# Patient Record
Sex: MALE | URBAN - METROPOLITAN AREA
[De-identification: names, ages, dates, MRNs, and addresses within clinical notes are randomized per-mention and may not be internally consistent; named-entity substitution may affect disease eponyms.]

---

## 2024-02-15 ENCOUNTER — OV NP (OUTPATIENT)
Dept: URBAN - METROPOLITAN AREA CLINIC 118 | Facility: CLINIC | Age: 34
End: 2024-02-15

## 2024-03-14 ENCOUNTER — APPOINTMENT (RX ONLY)
Dept: URBAN - METROPOLITAN AREA CLINIC 46 | Facility: CLINIC | Age: 34
Setting detail: DERMATOLOGY
End: 2024-03-14

## 2024-03-14 DIAGNOSIS — L20.89 OTHER ATOPIC DERMATITIS: ICD-10-CM | Status: STABLE

## 2024-03-14 DIAGNOSIS — L72.8 OTHER FOLLICULAR CYSTS OF THE SKIN AND SUBCUTANEOUS TISSUE: ICD-10-CM

## 2024-03-14 DIAGNOSIS — L91.8 OTHER HYPERTROPHIC DISORDERS OF THE SKIN: ICD-10-CM

## 2024-03-14 PROCEDURE — ? ADDITIONAL NOTES

## 2024-03-14 PROCEDURE — ? PHOTO-DOCUMENTATION

## 2024-03-14 PROCEDURE — ? COUNSELING

## 2024-03-14 PROCEDURE — ? OBSERVATION AND MEASURE

## 2024-03-14 PROCEDURE — 99203 OFFICE O/P NEW LOW 30 MIN: CPT

## 2024-03-14 ASSESSMENT — BSA RASH: BSA RASH: 0

## 2024-03-14 ASSESSMENT — LOCATION ZONE DERM
LOCATION ZONE: EYELID
LOCATION ZONE: FACE
LOCATION ZONE: HAND
LOCATION ZONE: TRUNK

## 2024-03-14 ASSESSMENT — LOCATION DETAILED DESCRIPTION DERM
LOCATION DETAILED: RIGHT CENTRAL EYEBROW
LOCATION DETAILED: LEFT LATERAL INFERIOR EYELID
LOCATION DETAILED: LEFT ULNAR DORSAL HAND
LOCATION DETAILED: RIGHT SUPERIOR UPPER BACK
LOCATION DETAILED: RIGHT RADIAL DORSAL HAND

## 2024-03-14 ASSESSMENT — LOCATION SIMPLE DESCRIPTION DERM
LOCATION SIMPLE: RIGHT EYEBROW
LOCATION SIMPLE: LEFT INFERIOR EYELID
LOCATION SIMPLE: LEFT HAND
LOCATION SIMPLE: RIGHT HAND
LOCATION SIMPLE: RIGHT UPPER BACK

## 2024-03-14 ASSESSMENT — ITCH NUMERIC RATING SCALE: HOW SEVERE IS YOUR ITCHING?: 0

## 2024-03-14 NOTE — PROCEDURE: ADDITIONAL NOTES
Render Risk Assessment In Note?: no
Detail Level: Simple
Additional Notes: Benign in nature can be cosmetically removed by snip or liquid nitrogen. Cosmetic out of pocket cost $150. Pt defers removal as he is about to go on a trip.
Additional Notes: Discussed surgical excision to permanently remove. Schedule with Dr. Lowe or Dr. Avila. Pt defers as he is about to go on a trip.
Additional Notes: Pt reports dry, scaling and cracking of hands during the winter months. He is not currently flaring. Discussed moisturizer daily and when flare up occur in the winter, we can send in a prescription topical cortisone cream for relief.

## 2024-03-14 NOTE — HPI: SECONDARY COMPLAINT
How Severe Is This Condition?: mild
Additional History: Patient also is here for possible cyst affecting RT mid back that was drained 4 years ago (outside dermatologist). He has an upcoming beach trip to [facility] next Wednesday and wanted it flat.

## 2024-03-14 NOTE — HPI: EVALUATION OF SKIN LESION(S)
What Type Of Note Output Would You Prefer (Optional)?: Bullet Format
Hpi Title: Evaluation of Skin Lesions
How Severe Are Your Spot(S)?: mild
Have Your Spot(S) Been Treated In The Past?: has not been treated
Additional History: Patient is here first tiny skin tags affecting RT upper eyelid and LT lower eyelid present 5 months.

## 2025-06-16 ENCOUNTER — OFFICE VISIT (OUTPATIENT)
Dept: URBAN - METROPOLITAN AREA CLINIC 70 | Facility: CLINIC | Age: 35
End: 2025-06-16
Payer: COMMERCIAL

## 2025-06-16 ENCOUNTER — LAB OUTSIDE AN ENCOUNTER (OUTPATIENT)
Dept: URBAN - METROPOLITAN AREA CLINIC 70 | Facility: CLINIC | Age: 35
End: 2025-06-16

## 2025-06-16 ENCOUNTER — DASHBOARD ENCOUNTERS (OUTPATIENT)
Age: 35
End: 2025-06-16

## 2025-06-16 DIAGNOSIS — K52.9 ENTEROCOLITIS: ICD-10-CM

## 2025-06-16 DIAGNOSIS — R13.10 ODYNOPHAGIA: ICD-10-CM

## 2025-06-16 DIAGNOSIS — A04.9 BACTERIAL INTESTINAL INFECTION, UNSPECIFIED: ICD-10-CM

## 2025-06-16 DIAGNOSIS — K21.9 GASTROESOPHAGEAL REFLUX DISEASE, UNSPECIFIED WHETHER ESOPHAGITIS PRESENT: ICD-10-CM

## 2025-06-16 DIAGNOSIS — R93.3 ABNORMAL COMPUTED TOMOGRAPHY OF GASTROINTESTINAL TRACT: ICD-10-CM

## 2025-06-16 DIAGNOSIS — R79.89 ELEVATED LFTS: ICD-10-CM

## 2025-06-16 PROBLEM — 30233002: Status: ACTIVE | Noted: 2025-06-16

## 2025-06-16 PROBLEM — 235595009: Status: ACTIVE | Noted: 2025-06-16

## 2025-06-16 PROCEDURE — 99204 OFFICE O/P NEW MOD 45 MIN: CPT | Performed by: NURSE PRACTITIONER

## 2025-06-16 RX ORDER — SUCRALFATE 1 G/1
1 TABLET ON AN EMPTY STOMACH TABLET ORAL TWICE A DAY
Qty: 28 TABLET | Refills: 1 | OUTPATIENT
Start: 2025-06-16

## 2025-06-16 RX ORDER — OMEPRAZOLE 40 MG/1
1 CAPSULE 30 MINUTES BEFORE MORNING MEAL CAPSULE, DELAYED RELEASE ORAL ONCE A DAY
Qty: 90 | Refills: 3 | OUTPATIENT
Start: 2025-06-16

## 2025-06-16 RX ORDER — CIPROFLOXACIN HYDROCHLORIDE 500 MG/1
1 TABLET TABLET, FILM COATED ORAL
Qty: 20 | Refills: 0 | OUTPATIENT
Start: 2025-06-16 | End: 2025-06-26

## 2025-06-16 NOTE — HPI-TODAY'S VISIT:
Patient is a 33 y/o male who presents today for multiple GI complaints. He reports chronic GERD with regurgiation for years but 2 week ago he developed acute  N/V, abdominal pain, and diarrhea. Vomiting has now resolved but has developed odynophagia. Not on PPI. Diarrhea and abdominal pain has improved. Labs 6/11/25 showed Hgb 17.1, , AST 54, and alk phos 199 (WBC, plt, T.luis, and lipase normal; acute hepatitis panel negative for viral hepatitis normal). Was seen by PCP with urine +nitrates and abdominal CT6/6/25 showed enterocolitis and wall thickening in esophagus likely esophagitis. U/s of abdomen completed but not resulted at this time. Admits to heavy alcohol (4-5 drinks every other day) but quit 2 weeks ago with onset of symptoms. No hx or Fhx of liver disease. No IVDU. No Fhx of colon cancer or IBD. Denies fever, jaundice, constiplation, or signs of GI bleeding.j

## 2025-06-24 ENCOUNTER — OFFICE VISIT (OUTPATIENT)
Dept: URBAN - METROPOLITAN AREA CLINIC 117 | Facility: CLINIC | Age: 35
End: 2025-06-24
Payer: COMMERCIAL

## 2025-06-24 ENCOUNTER — TELEPHONE ENCOUNTER (OUTPATIENT)
Dept: URBAN - METROPOLITAN AREA CLINIC 70 | Facility: CLINIC | Age: 35
End: 2025-06-24

## 2025-06-24 DIAGNOSIS — K76.0 STEATOSIS OF LIVER: ICD-10-CM

## 2025-06-24 PROCEDURE — 76981 USE PARENCHYMA: CPT | Performed by: INTERNAL MEDICINE

## 2025-06-24 RX ORDER — SUCRALFATE 1 G/1
1 TABLET ON AN EMPTY STOMACH TABLET ORAL TWICE A DAY
Qty: 28 TABLET | Refills: 1 | Status: ACTIVE | COMMUNITY
Start: 2025-06-16

## 2025-06-24 RX ORDER — OMEPRAZOLE 40 MG/1
1 CAPSULE 30 MINUTES BEFORE MORNING MEAL CAPSULE, DELAYED RELEASE ORAL ONCE A DAY
Qty: 90 | Refills: 3 | Status: ACTIVE | COMMUNITY
Start: 2025-06-16

## 2025-06-24 RX ORDER — CIPROFLOXACIN HYDROCHLORIDE 500 MG/1
1 TABLET TABLET, FILM COATED ORAL
Qty: 20 | Refills: 0 | Status: ACTIVE | COMMUNITY
Start: 2025-06-16 | End: 2025-06-26

## 2025-06-25 ENCOUNTER — TELEPHONE ENCOUNTER (OUTPATIENT)
Dept: URBAN - METROPOLITAN AREA CLINIC 70 | Facility: CLINIC | Age: 35
End: 2025-06-25

## 2025-07-28 ENCOUNTER — OFFICE VISIT (OUTPATIENT)
Dept: URBAN - METROPOLITAN AREA CLINIC 70 | Facility: CLINIC | Age: 35
End: 2025-07-28